# Patient Record
Sex: MALE | Race: OTHER | HISPANIC OR LATINO | Employment: FULL TIME | URBAN - METROPOLITAN AREA
[De-identification: names, ages, dates, MRNs, and addresses within clinical notes are randomized per-mention and may not be internally consistent; named-entity substitution may affect disease eponyms.]

---

## 2017-05-23 ENCOUNTER — OFFICE VISIT (OUTPATIENT)
Dept: MEDICAL GROUP | Facility: MEDICAL CENTER | Age: 22
End: 2017-05-23
Payer: COMMERCIAL

## 2017-05-23 VITALS
SYSTOLIC BLOOD PRESSURE: 126 MMHG | DIASTOLIC BLOOD PRESSURE: 68 MMHG | TEMPERATURE: 99.2 F | BODY MASS INDEX: 25.46 KG/M2 | HEIGHT: 68 IN | OXYGEN SATURATION: 95 % | HEART RATE: 99 BPM | WEIGHT: 168 LBS

## 2017-05-23 DIAGNOSIS — F32.2 MAJOR DEPRESSIVE DISORDER, SINGLE EPISODE, SEVERE WITHOUT PSYCHOTIC FEATURES (HCC): ICD-10-CM

## 2017-05-23 DIAGNOSIS — F98.8 ADD (ATTENTION DEFICIT DISORDER): ICD-10-CM

## 2017-05-23 DIAGNOSIS — E55.9 HYPOVITAMINOSIS D: ICD-10-CM

## 2017-05-23 DIAGNOSIS — Z00.00 HEALTH CARE MAINTENANCE: ICD-10-CM

## 2017-05-23 DIAGNOSIS — F41.1 GAD (GENERALIZED ANXIETY DISORDER): ICD-10-CM

## 2017-05-23 DIAGNOSIS — Z00.00 ANNUAL PHYSICAL EXAM: ICD-10-CM

## 2017-05-23 PROCEDURE — 99214 OFFICE O/P EST MOD 30 MIN: CPT | Performed by: INTERNAL MEDICINE

## 2017-05-23 NOTE — PROGRESS NOTES
CHIEF COMPLIANT  Rafat Mantilla is a 22 y.o. male who presents for a general exam    HCM  Recommendations:  Regular exercise at least 4 days a week  Diet: advised balanced   Dental exam at least 1-2 times per year  Self testicle exam.  Sunscreen use: advised    Immunizations:  UTD    TATI / Depression  Uncontrolled.   He had only xanax, that he used prn.   TATI score 21.    ADD  Controlled with adderall used as needed, not daily.     PAST MEDICAL HISTORY  Past Medical History   Diagnosis Date   • Anxiety    • Depression    • Attention/concentration deficit, non-ADHD      FAMILY HISTORY  Family History   Problem Relation Age of Onset   • Cancer Maternal Grandmother      colon   • Diabetes Mother    • Diabetes Maternal Grandfather    • Heart Disease Neg Hx    • Hypertension Neg Hx    • Hyperlipidemia Neg Hx    • Psychiatry Sister      anxiety   • Psychiatry Father      anxiety       SOCIAL HISTORY  Social History   Substance Use Topics   • Smoking status: Never Smoker    • Smokeless tobacco: Never Used   • Alcohol Use: 0.0 oz/week     0 Standard drinks or equivalent per week      Comment: rare      Social History     Social History Narrative     SURGICAL HISTORY  Past Surgical History   Procedure Laterality Date   • Appendectomy       CURRENT MEDICATIONS  Current Outpatient Prescriptions   Medication Sig Dispense Refill   • amphetamine-dextroamphetamine (ADDERALL) 10 MG Tab Take 1 Tab by mouth 2 times a day. 60 Tab 0   • amphetamine-dextroamphetamine (ADDERALL) 10 MG Tab Take 1 Tab by mouth 2 times a day. 60 Tab 0   • buPROPion (WELLBUTRIN) 75 MG Tab Take 1 Tab by mouth 2 times a day. Take 1.5 tabl twice daily, PO 60 Tab 2   • alprazolam (XANAX) 0.25 MG Tab Take 1 Tab by mouth at bedtime as needed for Sleep. 30 Tab 1   • acyclovir (ZOVIRAX) 400 MG tablet Take 1 Tab by mouth 2 times a day. 60 Tab 2   • Biotin w/ Vitamins C & E (HAIR SKIN & NAILS GUMMIES PO) Take 1 Each by mouth every 30 days.     • meloxicam (MOBIC)  "15 MG tablet Take 1 Tab by mouth every day. 30 Tab 0     No current facility-administered medications for this visit.     ALLERGIES  Allergies   Allergen Reactions   • Other Misc Hives     Pt stated allergies to plant fertilizer     REVIEW OF SYSTEMS  Constitutional: Denies fever, chills, or sweats  Skin: negative for rash, scaling, itching, pigmentation, hair or nail changes.  Eyes: negative for visual blurring, double vision, eye pain, floaters, eye discharge  Ears/Nose/Throat: negative for tinnitus, vertigo, bleeding gums, dental problem, hoarseness, frequent URI's, sinus trouble, persistent sore throat  Respiratory: negative for persistent cough, hemoptysis, dyspnea, recurrent pneumonia or bronchitis, asthma / wheezing  CVS: negative for palpitations, tachycardia, irregular heart beat, chest pain, exertional dyspnea, or peripheral edema.  GI: negative for poor appetite, dysphagia, nausea, heartburn / reflux, abdominal pain, hemorrhoids, constipation / diarrhea  : negative for nocturia, dysuria, frequency, hesitancy, incontinence, STD, abnormal penile discharge, or ED.  MS: negative for joint swelling and muscle pain/ soreness  Neuro: negative for migraine headaches, numbness, weakness, involuntary movements or tremor  Psych: negative for excessive alcohol consumption, illegal drug use. And per HPI.  Hematologic/Lymphatic/Immunologic: negative for anemia, unusual bruising, swollen glands, HIV risk factors  Endocrine: negative for temperature intolerance, polydipsia, polyuria, unintentional weight changes.     PHYSICAL EXAMINATION:  Blood pressure 126/68, pulse 99, temperature 37.3 °C (99.2 °F), height 1.727 m (5' 8\"), weight 76.204 kg (168 lb), SpO2 95 %.  Body mass index is 25.55 kg/(m^2).  Wt Readings from Last 4 Encounters:   05/23/17 76.204 kg (168 lb)   12/28/16 76.204 kg (168 lb)   08/24/16 72.576 kg (160 lb)   06/28/16 71.215 kg (157 lb)     General appearance:healthy, well developed, well nourished, " well-groomed  Psych: alert, no distress, cooperative. Eye contact good, speech goal directed. Affect calm.   Eyes: conjunctivae and sclerae normal, lids and lashes normal, EOMI, PERRLA. Fundoscopy: discs are sharp, no hemorrhage or exudate.  Ears: external ears normal to inspection, canals clear.TMs pearly gray with normal light reflex and anatomic landmarks.  Nose/Sinuses: nose shows no deformity, asymmetry. Nasal mucosa is normal.  No sinus tenderness.  Oropharynx: lips normal without lesions; buccal mucosa normal; gums healthy;  teeth intact, non-carious; palate normal; tongue midline and normal  Neck: no asymmetry, masses, adenopathy. Thyroid normal to palpation. Carotids without bruits, no jugular venous distention.  Lungs: clear to auscultation with good excursion. Normal respiratory rate. Chest symmetric no chest wall tenderness.  CVS: Regular rate and rhythm, no murmur.  No peripheral edema  Abdomen: No CVAT. Abdomen soft, non-tender, no masses palpable. No HSM or palpable renal abnormalities. Bowel sounds normal, no bruits heard.  MS: no evidence of joint effusion. ROM of all joints is normal. No crepitation detected. Strength grossly normal in UE and LE. No deformities present  Lymphatic: None significantly enlarged: supraclavicular, axillary, or inguinal  Skin: color normal, vascularity normal, no rashes or suspicious lesions, no evidence of bleeding or bruising  Neuro: Speech was normal.   CN 2-12 intact.  Muscle strength is 5/5, symmetric throughout.  DTR are 2/4, symmetric throughout.  Sensation to soft touch is intact, symmetric throughout.  Gait is normal. No tremor.     LABS    Reviewed and discussed:    Lab Results   Component Value Date/Time    CHOLESTEROL, 09/21/2016 03:13 PM    * 09/21/2016 03:13 PM    HDL 46 09/21/2016 03:13 PM    TRIGLYCERIDES 57 09/21/2016 03:13 PM       Lab Results   Component Value Date/Time    SODIUM 136 04/12/2016 07:50 AM    POTASSIUM 4.6 04/12/2016 07:50  AM    CHLORIDE 99 04/12/2016 07:50 AM    CO2 29 04/12/2016 07:50 AM    GLUCOSE 98 09/21/2016 03:13 PM    BUN 8 04/12/2016 07:50 AM    CREATININE 0.97 04/12/2016 07:50 AM     Lab Results   Component Value Date/Time    ALKALINE PHOSPHATASE 74 04/12/2016 07:50 AM    AST(SGOT) 15 04/12/2016 07:50 AM    ALT(SGPT) 29 04/12/2016 07:50 AM    TOTAL BILIRUBIN 1.1 04/12/2016 07:50 AM      No results found for: HBA1C  No results found for: TSH  No results found for: FREET4    Lab Results   Component Value Date/Time    WBC 6.8 04/12/2016 07:50 AM    RBC 5.43 04/12/2016 07:50 AM    HEMOGLOBIN 16.9 04/12/2016 07:50 AM    HEMATOCRIT 50.5 04/12/2016 07:50 AM    MCV 93.0 04/12/2016 07:50 AM    MCH 31.1 04/12/2016 07:50 AM    MCHC 33.5* 04/12/2016 07:50 AM    MPV 10.0 04/12/2016 07:50 AM    NEUTROPHILS-POLYS 64.10 04/12/2016 07:50 AM    LYMPHOCYTES 28.10 04/12/2016 07:50 AM    MONOCYTES 6.10 04/12/2016 07:50 AM    EOSINOPHILS 0.90 04/12/2016 07:50 AM    BASOPHILS 0.40 04/12/2016 07:50 AM     ASSESSMENT/PLAN    1. Annual physical exam  Reviewed PMH, PSH, FH, SH, ALL, MEDS, HCM/IMM.   Advised healthy habits, diet, exercise.     2. Health care maintenance  UTD     3. TATI (generalized anxiety disorder)  Uncontrolled, he will schedule OV to discuss treatment.     4. Major depressive disorder, single episode, severe without psychotic features (CMS-HCC)  Uncontrolled, he will schedule OV to discuss treatment.       5. Hypovitaminosis D  He was on prescription dose of Vitamin D     6. ADD (attention deficit disorder)  Uncontrolled, he will schedule OV to discuss treatment.     Smoking Counseling: Nonsmoker    Next office visit as needed    All questions are answered.    Please note that this dictation was created using voice recognition software. Despite all efforts, some minor grammar mistakes are possible.

## 2017-05-25 ENCOUNTER — OFFICE VISIT (OUTPATIENT)
Dept: MEDICAL GROUP | Facility: MEDICAL CENTER | Age: 22
End: 2017-05-25
Payer: COMMERCIAL

## 2017-05-25 VITALS
HEIGHT: 68 IN | OXYGEN SATURATION: 96 % | RESPIRATION RATE: 14 BRPM | DIASTOLIC BLOOD PRESSURE: 74 MMHG | TEMPERATURE: 98.8 F | WEIGHT: 168 LBS | BODY MASS INDEX: 25.46 KG/M2 | HEART RATE: 76 BPM | SYSTOLIC BLOOD PRESSURE: 122 MMHG

## 2017-05-25 DIAGNOSIS — F98.8 ADD (ATTENTION DEFICIT DISORDER): ICD-10-CM

## 2017-05-25 DIAGNOSIS — F41.1 GAD (GENERALIZED ANXIETY DISORDER): ICD-10-CM

## 2017-05-25 DIAGNOSIS — B00.9 RECURRENT HSV (HERPES SIMPLEX VIRUS): ICD-10-CM

## 2017-05-25 PROCEDURE — 99214 OFFICE O/P EST MOD 30 MIN: CPT | Performed by: INTERNAL MEDICINE

## 2017-05-25 RX ORDER — DEXTROAMPHETAMINE SACCHARATE, AMPHETAMINE ASPARTATE, DEXTROAMPHETAMINE SULFATE AND AMPHETAMINE SULFATE 2.5; 2.5; 2.5; 2.5 MG/1; MG/1; MG/1; MG/1
20 TABLET ORAL 2 TIMES DAILY
Qty: 129 TAB | Refills: 0 | Status: SHIPPED | OUTPATIENT
Start: 2017-05-25

## 2017-05-25 RX ORDER — DEXTROAMPHETAMINE SACCHARATE, AMPHETAMINE ASPARTATE, DEXTROAMPHETAMINE SULFATE AND AMPHETAMINE SULFATE 2.5; 2.5; 2.5; 2.5 MG/1; MG/1; MG/1; MG/1
10 TABLET ORAL 2 TIMES DAILY
Qty: 120 TAB | Refills: 0 | Status: SHIPPED | OUTPATIENT
Start: 2017-05-25

## 2017-05-25 RX ORDER — DEXTROAMPHETAMINE SACCHARATE, AMPHETAMINE ASPARTATE, DEXTROAMPHETAMINE SULFATE AND AMPHETAMINE SULFATE 2.5; 2.5; 2.5; 2.5 MG/1; MG/1; MG/1; MG/1
20 TABLET ORAL 2 TIMES DAILY
Qty: 120 TAB | Refills: 0 | Status: SHIPPED | OUTPATIENT
Start: 2017-05-25

## 2017-05-25 RX ORDER — ACYCLOVIR 400 MG/1
400 TABLET ORAL 2 TIMES DAILY
Qty: 60 TAB | Refills: 2 | Status: SHIPPED | OUTPATIENT
Start: 2017-05-25

## 2017-05-25 RX ORDER — ALPRAZOLAM 0.25 MG/1
0.25 TABLET ORAL NIGHTLY PRN
Qty: 30 TAB | Refills: 1 | Status: SHIPPED | OUTPATIENT
Start: 2017-05-25

## 2017-05-25 NOTE — MR AVS SNAPSHOT
"        Rafat Mantilla   2017 2:40 PM   Office Visit   MRN: [id number]    Department:  [place] [address]   Dept Phone:  [phone]    Description:  Male : 1995   Provider:  Siri Escobar M.D.           Reason for Visit     Medication Refill           Allergies as of 2017     Allergen Noted Reactions    Other Misc 2016   Hives    Pt stated allergies to plant fertilizer      You were diagnosed with     ADD (attention deficit disorder)   [074089]       Health care maintenance   [336471]       TATI (generalized anxiety disorder)   [875541]       Major depressive disorder, single episode, severe without psychotic features (CMS-HCC)   [004662]       Recurrent HSV (herpes simplex virus)   [643996]         Vital Signs     Blood Pressure Pulse Temperature Respirations Height Weight    122/74 mmHg 76 37.1 °C (98.8 °F) 14 1.727 m (5' 7.99\") 76.204 kg (168 lb)    Body Mass Index Oxygen Saturation Smoking Status             25.55 kg/m2 96% Never Smoker          Basic Information     Date Of Birth Sex Race Ethnicity Preferred Language    1995 Male Other  Origin (Belarusian,Iranian,French,Edy, etc) English      Your appointments     Aug 17, 2017  2:00 PM   Established Patient with Siri Escobar M.D.   [facility] [place]    [address]  [address]  [facility] [id number] [phone]           You will be receiving a confirmation call a few days before your appointment from our automated call confirmation system.              Problem List              ICD-10-CM Priority Class Noted - Resolved    Hypovitaminosis D E55.9   2016 - Present    ADD (attention deficit disorder) F98.8   2016 - Present      Health Maintenance        Date Due Completion Dates    IMM VARICELLA (CHICKENPOX) VACCINE (1 of 2 - 2 Dose Adolescent Series) 2006 ---    IMM DTaP/Tdap/Td Vaccine (7 - Td) 2019, 3/9/1998, 1994, " 1993, 1993, 1993            Current Immunizations     Dtap Vaccine 3/9/1998, 5/9/1994, 1993, 1993, 1993    HPV Quadrivalent Vaccine (GARDASIL) 9/23/2011, 10/19/2010, 8/10/2010    Hepatitis A Vaccine, Ped/Adol 2/27/2006, 8/22/2005, 1993    Hepatitis B Vaccine Non-Recombivax (Ped/Adol) 6/8/1998, 1993, 1993    Influenza Vaccine Quad Inj (Pf) 12/16/2016  4:13 PM    Influenza Vaccine Quad Inj (Preserved) 12/29/2015, 4/29/2015    MMR Vaccine 3/9/1998, 1/26/1994    Meningococcal Conjugate Vaccine MCV4 (Menveo) 8/10/2010, 12/9/2008    Tdap Vaccine 12/9/2009      Below and/or attached are the medications your provider expects you to take. Review all of your home medications and newly ordered medications with your provider and/or pharmacist. Follow medication instructions as directed by your provider and/or pharmacist. Please keep your medication list with you and share with your provider. Update the information when medications are discontinued, doses are changed, or new medications (including over-the-counter products) are added; and carry medication information at all times in the event of emergency situations     Allergies:  OTHER MISC - Hives               Medications  Valid as of: May 25, 2017 -  2:48 PM    Generic Name Brand Name Tablet Size Instructions for use    Acyclovir (Tab) ZOVIRAX 400 MG Take 1 Tab by mouth 2 times a day.        ALPRAZolam (Tab) XANAX 0.25 MG Take 1 Tab by mouth at bedtime as needed for Sleep.        Amphetamine-Dextroamphetamine (Tab) ADDERALL 10 MG Take 2 Tabs by mouth 2 times a day.        Amphetamine-Dextroamphetamine (Tab) ADDERALL 10 MG Take 2 Tabs by mouth 2 times a day.        Amphetamine-Dextroamphetamine (Tab) ADDERALL 10 MG Take 1 Tab by mouth 2 times a day.        Biotin w/ Vitamins C & E   Take 1 Each by mouth every 30 days.        BuPROPion HCl (Tab) WELLBUTRIN 75 MG Take 1 Tab by mouth 2 times a day. Take 1.5 tabl twice daily, PO         Meloxicam (Tab) MOBIC 15 MG Take 1 Tab by mouth every day.        .                 Medicines prescribed today were sent to:     [id number] - [place], [place] - [address] AT [facility]    [address] [place] [address]    Phone: [phone] Fax: [phone]    Open 24 Hours?: No      Medication refill instructions:       If your prescription bottle indicates you have medication refills left, it is not necessary to call your provider’s office. Please contact your pharmacy and they will refill your medication.    If your prescription bottle indicates you do not have any refills left, you may request refills at any time through one of the following ways: The online U[employer]idy system (except Urgent Care), by calling your provider’s office, or by asking your pharmacy to contact your provider’s office with a refill request. Medication refills are processed only during regular business hours and may not be available until the next business day. Your provider may request additional information or to have a follow-up visit with you prior to refilling your medication.   *Please Note: Medication refills are assigned a new Rx number when refilled electronically. Your pharmacy may indicate that no refills were authorized even though a new prescription for the same medication is available at the pharmacy. Please request the medicine by name with the pharmacy before contacting your provider for a refill.           U[employer]idy Access Code: Activation code not generated  Current [employer] Status: Active

## 2017-05-25 NOTE — PROGRESS NOTES
CHIEF COMPLAINT  Chief Complaint   Patient presents with   • Medication Refill   Adderall    HPI  Patient is a 22 y.o. male patient who presents today for the following     ADD  The patient has difficulty concentration, when he needs to study, and prevented him to go to college.    Questionnaire was positive for  Organization, improved:  - poor organizational skills (home, office, desk, or car is extremely messy and cluttered)  - trouble starting and finishing projects  +   - chronic lateness    +  - frequently forgetting appointments, commitments, and deadlines        - constantly losing or misplacing things (keys, wallet, phone, documents, bills)    +  - underestimating the time it will take you to complete tasks     +    Impulse control, improved:  - frequently interrupt others or talk over them  - have poor self-control  +  - blurt out thoughts that are rude or inappropriate without thinking +  - have addictive tendencies  - act recklessly or spontaneously without regard for consequences, calmer  - trouble behaving in socially appropriate ways (such as sitting still during a long meeting)   +    Emotional difficulties: improved  - sense of underachievement   +  - doesn’t deal well with frustration  +  - easily flustered and stressed out     - irritability or mood swings  +  - trouble staying motivated  +  - hypersensitivity to criticism  +  - short, often explosive, temper   +  - low self-esteem and sense of insecurity  +    ANXIETY  Interval course:    1. Improved  2. Used xanax as needed, not daily    Onset: At the age of 10 y/o, worse after the age of 14 y/o.    Since then symptoms were up/down  Mood currently does affect, improved: work, relationships, social activities.  Previous medications:  Bupropion  Current medications: Xanax, 0.25 mg as needed, usually for sleep    Risk factors:   · Depression and anxiety  · H/o phobia: yes (not performing job)  · H/o panic attacks: no  · H/o hypomanic or manic  episode: no  · Substance abuse (alcohol, prescription drugs caffeine, tobacco): no  · Family support: yes  · Living alone:  no  · Family history of psych disorders: yes  · Stress: no  · PMH of abuse (sexual, physical, emotional abuse; neglect): no      Recurrent HSV infection  The patient has had recurrent HSV infection and requested acyclovir refill.   No current lesions.    Reviewed PMH, PSH, FH, SH, ALL, HCM/IMM, no changes  Reviewed MEDS, no changes    Patient Active Problem List    Diagnosis Date Noted   • Hypovitaminosis D 06/28/2016   • ADD (attention deficit disorder) 06/28/2016     CURRENT MEDICATIONS  Current Outpatient Prescriptions   Medication Sig Dispense Refill   • amphetamine-dextroamphetamine (ADDERALL) 10 MG Tab Take 2 Tabs by mouth 2 times a day. 120 Tab 0   • amphetamine-dextroamphetamine (ADDERALL) 10 MG Tab Take 2 Tabs by mouth 2 times a day. 129 Tab 0   • amphetamine-dextroamphetamine (ADDERALL) 10 MG Tab Take 1 Tab by mouth 2 times a day. 120 Tab 0   • alprazolam (XANAX) 0.25 MG Tab Take 1 Tab by mouth at bedtime as needed for Sleep. 30 Tab 1   • acyclovir (ZOVIRAX) 400 MG tablet Take 1 Tab by mouth 2 times a day. 60 Tab 2   • buPROPion (WELLBUTRIN) 75 MG Tab Take 1 Tab by mouth 2 times a day. Take 1.5 tabl twice daily, PO 60 Tab 2   • Biotin w/ Vitamins C & E (HAIR SKIN & NAILS GUMMIES PO) Take 1 Each by mouth every 30 days.     • meloxicam (MOBIC) 15 MG tablet Take 1 Tab by mouth every day. 30 Tab 0     No current facility-administered medications for this visit.     ALLERGIES  Allergies: Other [facility]  PAST MEDICAL HISTORY  Past Medical History   Diagnosis Date   • Anxiety    • Depression    • Attention/concentration deficit, non-ADHD      SURGICAL HISTORY  He  has past surgical history that includes appendectomy.  SOCIAL HISTORY  Social History   Substance Use Topics   • Smoking status: Never Smoker    • Smokeless tobacco: Never Used   • Alcohol Use: 0.0 oz/week     0 Standard drinks or  "equivalent per week      Comment: rare      Social History     Social History Narrative     FAMILY HISTORY  Family History   Problem Relation Age of Onset   • Cancer Maternal Grandmother      colon   • Diabetes Mother    • Diabetes Maternal Grandfather    • Heart Disease Neg Hx    • Hypertension Neg Hx    • Hyperlipidemia Neg Hx    • Psychiatry Sister      anxiety   • Psychiatry Father      anxiety     No family status information on file.     ROS   Constitutional: Negative for fever, chills.  HENT: Negative for congestion, sore throat.  Eyes: Negative for blurred vision.   Respiratory: Negative for cough, shortness of breath.  Cardiovascular: Negative for chest pain, palpitations.   Gastrointestinal: Negative for heartburn, nausea, abdominal pain.   Genitourinary: Negative for dysuria.  Musculoskeletal: Negative for significant myalgias, back pain and joint pain.   Skin: Negative for rash and itching.   Neuro: Negative for dizziness, weakness and headaches.   Endo/Heme/Allergies: Does not bruise/bleed easily.   Inf: as above.  Psychiatric/Behavioral: As above.    PHYSICAL EXAM   Blood pressure 122/74, pulse 76, temperature 37.1 °C (98.8 °F), resp. rate 14, height 1.727 m (5' 7.99\"), weight 76.204 kg (168 lb), SpO2 96 %. Body mass index is 25.55 kg/(m^2).  General:  NAD, well appearing  HEENT:   NC/AT, PERRLA, EOMI, TMs are clear. Oropharyngeal mucosa is pink,  without lesions;  no cervical / supraclavicular  lymphadenopathy, no thyromegaly.    Cardiovascular: RRR.   No m/r/g. No carotid bruits .      Lungs:   CTAB, no w/r/r, no respiratory distress.  Abdomen: Soft, NT/ND + BS; no suprapubic tenderness; no masses or hepatosplenomegaly.  Extremities:  2+ DP and radial pulses bilaterally.  No c/c/e.   Skin:  Warm, dry.  No erythema. No rash.   Neurologic: Alert & oriented x 3. No focal deficits.  Psychiatric:  Affect normal, mood normal, judgment normal.    LABS     None.    IMAGING     None    ASSESMENT AND PLAN      "   1. ADD (attention deficit disorder)  Refill:  - amphetamine-dextroamphetamine (ADDERALL) 10 MG Tab; Take 2 Tabs by mouth 2 times a day.  Dispense: 120 Tab; Refill: 0  - amphetamine-dextroamphetamine (ADDERALL) 10 MG Tab; Take 2 Tabs by mouth 2 times a day.  Dispense: 129 Tab; Refill: 0  - amphetamine-dextroamphetamine (ADDERALL) 10 MG Tab; Take 1 Tab by mouth 2 times a day.  Dispense: 120 Tab; Refill: 0    · Reviewed effects and side effects of medication, the patient verbalized understanding  ·  was reviewed and scabbed in media    2. TATI (generalized anxiety disorder)  Refill:  - alprazolam (XANAX) 0.25 MG Tab; Take 1 Tab by mouth at bedtime as needed for Sleep.  Dispense: 30 Tab; Refill: 1    3. Recurrent HSV (herpes simplex virus)  Refill:  - acyclovir (ZOVIRAX) 400 MG tablet; Take 1 Tab by mouth 2 times a day.  Dispense: 60 Tab; Refill: 2    Counseling:   - Smoking:  Nonsmoker    Followup: Return in about 3 months (around 8/25/2017) for Short.    All questions are answered.    Please note that this dictation was created using voice recognition software, and that there might be errors of neeta and possibly content.

## 2017-08-17 ENCOUNTER — APPOINTMENT (OUTPATIENT)
Dept: MEDICAL GROUP | Facility: MEDICAL CENTER | Age: 22
End: 2017-08-17
Payer: COMMERCIAL

## 2017-08-23 ENCOUNTER — TELEPHONE (OUTPATIENT)
Dept: MEDICAL GROUP | Facility: MEDICAL CENTER | Age: 22
End: 2017-08-23

## 2017-09-29 ENCOUNTER — EH NON-PROVIDER (OUTPATIENT)
Dept: OCCUPATIONAL MEDICINE | Facility: CLINIC | Age: 22
End: 2017-09-29

## 2017-09-29 DIAGNOSIS — Z29.89 NEED FOR ISOLATION: ICD-10-CM

## 2017-09-29 PROCEDURE — 94375 RESPIRATORY FLOW VOLUME LOOP: CPT

## 2017-10-03 ENCOUNTER — IMMUNIZATION (OUTPATIENT)
Dept: OCCUPATIONAL MEDICINE | Facility: CLINIC | Age: 22
End: 2017-10-03

## 2017-10-03 DIAGNOSIS — Z23 NEED FOR VACCINATION: ICD-10-CM

## 2017-10-03 PROCEDURE — 90686 IIV4 VACC NO PRSV 0.5 ML IM: CPT | Performed by: PREVENTIVE MEDICINE

## 2018-01-21 ENCOUNTER — OFFICE VISIT (OUTPATIENT)
Dept: URGENT CARE | Facility: CLINIC | Age: 23
End: 2018-01-21
Payer: COMMERCIAL

## 2018-01-21 VITALS
DIASTOLIC BLOOD PRESSURE: 70 MMHG | SYSTOLIC BLOOD PRESSURE: 110 MMHG | TEMPERATURE: 98.3 F | RESPIRATION RATE: 16 BRPM | HEART RATE: 80 BPM | OXYGEN SATURATION: 98 % | BODY MASS INDEX: 26.06 KG/M2 | WEIGHT: 166 LBS | HEIGHT: 67 IN

## 2018-01-21 DIAGNOSIS — R05.9 COUGH: ICD-10-CM

## 2018-01-21 PROCEDURE — 99213 OFFICE O/P EST LOW 20 MIN: CPT | Performed by: PHYSICIAN ASSISTANT

## 2018-01-21 ASSESSMENT — ENCOUNTER SYMPTOMS
ABDOMINAL PAIN: 0
FEVER: 0
SHORTNESS OF BREATH: 0
DIARRHEA: 0
WHEEZING: 0
CHILLS: 0
VOMITING: 0
NAUSEA: 0

## 2018-01-21 NOTE — PROGRESS NOTES
"Subjective:   Rafat Mantilla is a 22 y.o. male who presents for Other (needs note to go back to work)        Few weeks ago had the 'flu' really bad. Was told to get a note for prior illness. Noted missed Monday, Tuesday and Wednesday 8th, 9th and 10th. Had 'bad stomach ache' and bad dry throat feeling, c/o nausea denies emesis, denies fever/chills      Other   Pertinent negatives include no abdominal pain, chills, fever, nausea, rash or vomiting.     Review of Systems   Constitutional: Negative for chills and fever.   Respiratory: Negative for shortness of breath and wheezing.    Gastrointestinal: Negative for abdominal pain, diarrhea, nausea and vomiting.   Skin: Negative for rash.     Allergies   Allergen Reactions   • Other Misc Hives     Pt stated allergies to plant fertilizer      Objective:   /70   Pulse 80   Temp 36.8 °C (98.3 °F)   Resp 16   Ht 1.702 m (5' 7\")   Wt 75.3 kg (166 lb)   SpO2 98%   BMI 26.00 kg/m²   Physical Exam   Constitutional: He is oriented to person, place, and time. He appears well-developed and well-nourished. No distress.   HENT:   Head: Normocephalic and atraumatic.   Right Ear: External ear normal.   Left Ear: External ear normal.   Nose: Nose normal.   Eyes: Conjunctivae are normal. Right eye exhibits no discharge. Left eye exhibits no discharge. No scleral icterus.   Neck: Neck supple.   Pulmonary/Chest: Effort normal. No respiratory distress.   Musculoskeletal: Normal range of motion.   Neurological: He is alert and oriented to person, place, and time. Coordination normal.   Skin: Skin is warm and dry. He is not diaphoretic. No pallor.   Psychiatric: He has a normal mood and affect.   Nursing note and vitals reviewed.        Assessment/Plan:   Assessment    1. Cough  Sent w/ work note, instructed to come to clinic sooner to time of missed work in future    Differential diagnosis, natural history, supportive care, and indications for immediate follow-up " discussed.

## 2018-01-21 NOTE — LETTER
January 21, 2018       Patient: Rafat Mantilla   YOB: 1995   Date of Visit: 1/21/2018         To Whom It May Concern:    It is my medical opinion that Rafat Mantilla should be excused from prior missed work on the 8th, 9th and 10th of January due to illness.        If you have any questions or concerns, please don't hesitate to call [phone]          Sincerely,          Julián Casillas P.A.-C.  Electronically Signed

## 2018-08-07 ENCOUNTER — DOCUMENTATION (OUTPATIENT)
Dept: OCCUPATIONAL MEDICINE | Facility: CLINIC | Age: 23
End: 2018-08-07

## 2018-09-14 ENCOUNTER — EH NON-PROVIDER (OUTPATIENT)
Dept: OCCUPATIONAL MEDICINE | Facility: CLINIC | Age: 23
End: 2018-09-14

## 2018-09-14 DIAGNOSIS — Z02.89 ENCOUNTER FOR OCCUPATIONAL HEALTH EXAMINATION INVOLVING RESPIRATOR: Primary | ICD-10-CM

## 2018-09-14 PROCEDURE — 94375 RESPIRATORY FLOW VOLUME LOOP: CPT | Performed by: PREVENTIVE MEDICINE

## 2018-10-03 ENCOUNTER — IMMUNIZATION (OUTPATIENT)
Dept: OCCUPATIONAL MEDICINE | Facility: CLINIC | Age: 23
End: 2018-10-03

## 2018-10-03 DIAGNOSIS — Z23 NEED FOR VACCINATION: ICD-10-CM

## 2018-10-10 PROCEDURE — 90686 IIV4 VACC NO PRSV 0.5 ML IM: CPT | Performed by: PREVENTIVE MEDICINE

## 2018-10-12 ENCOUNTER — OFFICE VISIT (OUTPATIENT)
Dept: URGENT CARE | Facility: CLINIC | Age: 23
End: 2018-10-12
Payer: COMMERCIAL

## 2018-10-12 ENCOUNTER — HOSPITAL ENCOUNTER (OUTPATIENT)
Facility: MEDICAL CENTER | Age: 23
End: 2018-10-12
Attending: PHYSICIAN ASSISTANT
Payer: COMMERCIAL

## 2018-10-12 VITALS
SYSTOLIC BLOOD PRESSURE: 118 MMHG | WEIGHT: 163 LBS | HEIGHT: 67 IN | HEART RATE: 76 BPM | BODY MASS INDEX: 25.58 KG/M2 | RESPIRATION RATE: 16 BRPM | TEMPERATURE: 98.4 F | OXYGEN SATURATION: 96 % | DIASTOLIC BLOOD PRESSURE: 70 MMHG

## 2018-10-12 DIAGNOSIS — Z20.2 STD EXPOSURE: Primary | ICD-10-CM

## 2018-10-12 DIAGNOSIS — Z20.2 STD EXPOSURE: ICD-10-CM

## 2018-10-12 LAB
APPEARANCE UR: CLEAR
BILIRUB UR STRIP-MCNC: NEGATIVE MG/DL
COLOR UR AUTO: NORMAL
GLUCOSE UR STRIP.AUTO-MCNC: NEGATIVE MG/DL
HIV 1+2 AB+HIV1 P24 AG SERPL QL IA: NON REACTIVE
KETONES UR STRIP.AUTO-MCNC: NEGATIVE MG/DL
LEUKOCYTE ESTERASE UR QL STRIP.AUTO: NEGATIVE
NITRITE UR QL STRIP.AUTO: NEGATIVE
PH UR STRIP.AUTO: 5.5 [PH] (ref 5–8)
PROT UR QL STRIP: NEGATIVE MG/DL
RBC UR QL AUTO: NEGATIVE
SP GR UR STRIP.AUTO: 1.02
TREPONEMA PALLIDUM IGG+IGM AB [PRESENCE] IN SERUM OR PLASMA BY IMMUNOASSAY: NON REACTIVE
UROBILINOGEN UR STRIP-MCNC: 0.2 MG/DL

## 2018-10-12 PROCEDURE — 86780 TREPONEMA PALLIDUM: CPT

## 2018-10-12 PROCEDURE — 99214 OFFICE O/P EST MOD 30 MIN: CPT | Performed by: PHYSICIAN ASSISTANT

## 2018-10-12 PROCEDURE — 87591 N.GONORRHOEAE DNA AMP PROB: CPT

## 2018-10-12 PROCEDURE — 81002 URINALYSIS NONAUTO W/O SCOPE: CPT | Performed by: PHYSICIAN ASSISTANT

## 2018-10-12 PROCEDURE — 87491 CHLMYD TRACH DNA AMP PROBE: CPT

## 2018-10-12 PROCEDURE — 87389 HIV-1 AG W/HIV-1&-2 AB AG IA: CPT

## 2018-10-12 RX ORDER — EMTRICITABINE AND TENOFOVIR DISOPROXIL FUMARATE 200; 300 MG/1; MG/1
1 TABLET, FILM COATED ORAL DAILY
Qty: 30 TAB | Refills: 0 | Status: SHIPPED | OUTPATIENT
Start: 2018-10-12 | End: 2018-11-11

## 2018-10-12 NOTE — PATIENT INSTRUCTIONS
Safe Sex  Safe sex is about reducing the risk of giving or getting a sexually transmitted disease (STD). STDs are spread through sexual contact involving the genitals, mouth, or rectum. Some STDs can be cured and others cannot. Safe sex can also prevent unintended pregnancies.   WHAT ARE SOME SAFE SEX PRACTICES?  · Limit your sexual activity to only one partner who is having sex with only you.  · Talk to your partner about his or her past partners, past STDs, and drug use.  · Use a condom every time you have sexual intercourse. This includes vaginal, oral, and anal sexual activity. Both females and males should wear condoms during oral sex. Only use latex or polyurethane condoms and water-based lubricants. Using petroleum-based lubricants or oils to lubricate a condom will weaken the condom and increase the chance that it will break. The condom should be in place from the beginning to the end of sexual activity. Wearing a condom reduces, but does not completely eliminate, your risk of getting or giving an STD. STDs can be spread by contact with infected body fluids and skin.  · Get vaccinated for hepatitis B and HPV.  · Avoid alcohol and recreational drugs, which can affect your judgment. You may forget to use a condom or participate in high-risk sex.  · For females, avoid douching after sexual intercourse. Douching can spread an infection farther into the reproductive tract.  · Check your body for signs of sores, blisters, rashes, or unusual discharge. See your health care provider if you notice any of these signs.  · Avoid sexual contact if you have symptoms of an infection or are being treated for an STD. If you or your partner has herpes, avoid sexual contact when blisters are present. Use condoms at all other times.  · If you are at risk of being infected with HIV, it is recommended that you take a prescription medicine daily to prevent HIV infection. This is called pre-exposure prophylaxis (PrEP). You are  considered at risk if:  ¨ You are a man who has sex with other men (MSM).  ¨ You are a heterosexual man or woman who is sexually active with more than one partner.  ¨ You take drugs by injection.  ¨ You are sexually active with a partner who has HIV.  · Talk with your health care provider about whether you are at high risk of being infected with HIV. If you choose to begin PrEP, you should first be tested for HIV. You should then be tested every 3 months for as long as you are taking PrEP.  · See your health care provider for regular screenings, exams, and tests for other STDs. Before having sex with a new partner, each of you should be screened for STDs and should talk about the results with each other.  WHAT ARE THE BENEFITS OF SAFE SEX?   · There is less chance of getting or giving an STD.  · You can prevent unwanted or unintended pregnancies.  · By discussing safe sex concerns with your partner, you may increase feelings of intimacy, comfort, trust, and honesty between the two of you.  This information is not intended to replace advice given to you by your health care provider. Make sure you discuss any questions you have with your health care provider.  Document Released: 01/25/2006 Document Revised: 01/08/2016 Document Reviewed: 11/06/2016  Elsevier Interactive Patient Education © 2017 [employer]

## 2018-10-12 NOTE — PROGRESS NOTES
Subjective:      Pt is a 23 y.o. male who presents with STD exposure          HPI  Pt notes concern for STD exposure but denies current symptoms. Pt states last night he had unprotected anal sex with a new partner and bled afterwards and is concerned about portexp prophylaxis for HIV. Pt has not taken any Rx medications for this condition. Pt states the pain is a 0/10. Pt denies CP, SOB, NVD, paresthesias, headaches, dizziness, change in vision, hives, or other joint pain. The pt's medication list, problem list, and allergies have been evaluated and reviewed during today's visit.    PMH:  Past Medical History:   Diagnosis Date   • Anxiety    • Attention/concentration deficit, non-ADHD    • Depression        PSH:  Past Surgical History:   Procedure Laterality Date   • APPENDECTOMY         Fam Hx:    family history includes Cancer in his maternal grandmother; Diabetes in his maternal grandfather and mother; Psychiatry in his father and sister.  Family Status   Relation Status   • MGMo (Not Specified)   • Mo (Not Specified)   • MGFa (Not Specified)   • Sis (Not Specified)   • Fa (Not Specified)   • Neg Hx (Not Specified)       Soc HX:  Social History     Social History   • Marital status: Single     Spouse name: N/A   • Number of children: N/A   • Years of education: N/A     Occupational History   • Not on file.     Social History Main Topics   • Smoking status: Never Smoker   • Smokeless tobacco: Never Used   • Alcohol use 0.0 oz/week      Comment: rare    • Drug use: No   • Sexual activity: Not on file     Other Topics Concern   • Not on file     Social History Narrative   • No narrative on file         Medications:    Current Outpatient Prescriptions:   •  amphetamine-dextroamphetamine (ADDERALL) 10 MG Tab, Take 2 Tabs by mouth 2 times a day., Disp: 120 Tab, Rfl: 0  •  amphetamine-dextroamphetamine (ADDERALL) 10 MG Tab, Take 2 Tabs by mouth 2 times a day., Disp: 129 Tab, Rfl: 0  •  amphetamine-dextroamphetamine  (ADDERALL) 10 MG Tab, Take 1 Tab by mouth 2 times a day., Disp: 120 Tab, Rfl: 0  •  alprazolam (XANAX) 0.25 MG Tab, Take 1 Tab by mouth at bedtime as needed for Sleep., Disp: 30 Tab, Rfl: 1  •  acyclovir (ZOVIRAX) 400 MG tablet, Take 1 Tab by mouth 2 times a day., Disp: 60 Tab, Rfl: 2  •  buPROPion (WELLBUTRIN) 75 MG Tab, Take 1 Tab by mouth 2 times a day. Take 1.5 tabl twice daily, PO, Disp: 60 Tab, Rfl: 2  •  Biotin w/ Vitamins C & E (HAIR SKIN & NAILS GUMMIES PO), Take 1 Each by mouth every 30 days., Disp: , Rfl:   •  meloxicam (MOBIC) 15 MG tablet, Take 1 Tab by mouth every day., Disp: 30 Tab, Rfl: 0      Allergies:  Other misc    ROS  Constitutional: Negative for fever, chills and malaise/fatigue.   HENT: Negative for congestion and sore throat.    Eyes: Negative for blurred vision, double vision and photophobia.   Respiratory: Negative for cough and shortness of breath.  Cardiovascular: Negative for chest pain and palpitations.   Gastrointestinal: Negative for heartburn, nausea, vomiting, abdominal pain, diarrhea and constipation.   Genitourinary: Negative for dysuria and flank pain.   Musculoskeletal: Negative for joint pain and myalgias.   Skin: Negative for itching and rash.   Neurological: Negative for dizziness, tingling and headaches.   Endo/Heme/Allergies: Does not bruise/bleed easily.   Psychiatric/Behavioral: Negative for depression. The patient is not nervous/anxious.           Objective:     VSS< aF    Physical Exam      Constitutional: PT is oriented to person, place, and time. PT appears well-developed and well-nourished. No distress.   HENT:   Head: Normocephalic and atraumatic.   Mouth/Throat: Oropharynx is clear and moist. No oropharyngeal exudate.   Eyes: Conjunctivae normal and EOM are normal. Pupils are equal, round, and reactive to light.   Neck: Normal range of motion. Neck supple. No thyromegaly present.   Cardiovascular: Normal rate, regular rhythm, normal heart sounds and intact distal  pulses.  Exam reveals no gallop and no friction rub.    No murmur heard.  Pulmonary/Chest: Effort normal and breath sounds normal. No respiratory distress. PT has no wheezes. PT has no rales. Pt exhibits no tenderness.   Abdominal: Soft. Bowel sounds are normal. PT exhibits no distension and no mass. There is no tenderness. There is no rebound and no guarding.   Musculoskeletal: Normal range of motion. PT exhibits no edema and no tenderness.   Neurological: PT is alert and oriented to person, place, and time. PT has normal reflexes. No cranial nerve deficit.   Skin: Skin is warm and dry. No rash noted. PT is not diaphoretic. No erythema.       Psychiatric: PT has a normal mood and affect. PT behavior is normal. Judgment and thought content normal.          Assessment/Plan:     1. STD exposure    -POC urinalysis  -G/C-->pending    HIV and syphilis labs drawn    Called Jaja in I.D. at [phone] and he recommended 4 weeks of Truvada and Raltegravir  Pt to follow with PCP  Rest, fluids encouraged.  AVS with medical info given.  Pt was in full understanding and agreement with the plan.  Follow-up as needed if symptoms worsen or fail to improve.

## 2018-10-14 LAB
C TRACH DNA SPEC QL NAA+PROBE: NEGATIVE
N GONORRHOEA DNA SPEC QL NAA+PROBE: NEGATIVE
SPECIMEN SOURCE: NORMAL

## 2018-10-15 ENCOUNTER — TELEPHONE (OUTPATIENT)
Dept: URGENT CARE | Facility: CLINIC | Age: 23
End: 2018-10-15

## 2018-10-15 NOTE — TELEPHONE ENCOUNTER
Called and left message with pt about STD culture results which came back negative.   Pt to follow up in 55 days for retest of HIV lab with PCP back home. Pt was told this at the time of visit.  Encouraged Pt to call back with questions.  Moises Jaramillo PA-C

## 2018-12-22 NOTE — TELEPHONE ENCOUNTER
ESTABLISHED PATIENT PRE-VISIT PLANNING     Note: Patient will not be contacted if there is no indication to call.     1.  Reviewed notes from the last few office visits within the medical group: Yes 05/25/2017    2.  If any orders were placed at last visit or intended to be done for this visit (i.e. 6 mos follow-up), do we have Results/Consult Notes?        •  Labs - Labs were not ordered at last office visit.       •  Imaging - Imaging was not ordered at last office visit.       •  Referrals - No referrals were ordered at last office visit.    3. Is this appointment scheduled as a Hospital Follow-Up? No    4.  Immunizations were updated in [facility] using WebIZ?: Yes       •  Web Iz Recommendations: FLU VARICRLLA    5.  Patient is due for the following Health Maintenance Topics:   Health Maintenance Due   Topic Date Due   • IMM VARICELLA (CHICKENPOX) VACCINE (1 of 2 - 2 Dose Adolescent Series) 01/26/2006   • IMM INFLUENZA (1) 09/01/2017           6.  Patient was NOT informed to arrive 15 min prior to their scheduled appointment and bring in their medication bottles.    
normal...